# Patient Record
Sex: FEMALE | URBAN - METROPOLITAN AREA
[De-identification: names, ages, dates, MRNs, and addresses within clinical notes are randomized per-mention and may not be internally consistent; named-entity substitution may affect disease eponyms.]

---

## 2024-09-29 ENCOUNTER — NURSE TRIAGE (OUTPATIENT)
Dept: NURSING | Facility: CLINIC | Age: 19
End: 2024-09-29

## 2024-09-29 NOTE — TELEPHONE ENCOUNTER
Triage call  Patient calling last night she was in the shower and she became dizzy her vision  became black and she fell. This morning hs is feeling nauseated  and short of breath she states she is struggling to breath.  She is not feeling dizzy right now but her lips are grey in color.    Per protocol call 911 now.Care advice given.  Verbalizes understanding and agrees with plan.    Cheyanne Alonso RN   [facility] Nurse Advisor  7:51 AM 9/29/2024    Reason for Disposition   Bluish (or gray) lips or face now    Additional Information   Negative: SEVERE difficulty breathing (e.g., struggling for each breath, speaks in single words)   Negative: [1] Breathing stopped AND [2] hasn't returned   Negative: Choking on something    Protocols used: Breathing Difficulty-A-AH